# Patient Record
Sex: FEMALE | Race: BLACK OR AFRICAN AMERICAN | NOT HISPANIC OR LATINO | Employment: OTHER | URBAN - METROPOLITAN AREA
[De-identification: names, ages, dates, MRNs, and addresses within clinical notes are randomized per-mention and may not be internally consistent; named-entity substitution may affect disease eponyms.]

---

## 2019-08-09 ENCOUNTER — SOCIAL WORK (OUTPATIENT)
Dept: ADMINISTRATIVE | Facility: OTHER | Age: 24
End: 2019-08-09

## 2019-08-09 NOTE — PROGRESS NOTES
SW met with pt regarding initial OB assessment. Pt stated this is her 3rd pregnancy/0-miscarriage. Pt stated lives with her grandmother/children-3,9 months and able to perform ADL's independently. Pt stated support system is her mother/Rhianna.Pt stated has applied for medicaid. Pt stated does not have WIC. SW provide pt with information on other community resources.  No other needs identified at this time.    Sherine Garcia,MSW  Pager#[phone]

## 2019-12-05 PROBLEM — R10.2 PELVIC PAIN DURING PREGNANCY: Status: ACTIVE | Noted: 2019-12-05

## 2019-12-05 PROBLEM — Z3A.38 38 WEEKS GESTATION OF PREGNANCY: Status: ACTIVE | Noted: 2019-12-05

## 2019-12-05 PROBLEM — O09.93 SUPERVISION OF HIGH RISK PREGNANCY IN THIRD TRIMESTER: Status: ACTIVE | Noted: 2019-12-05

## 2019-12-05 PROBLEM — Z03.71 ENCOUNTER FOR SUSPECTED PREMATURE RUPTURE OF AMNIOTIC MEMBRANES, WITH RUPTURE OF MEMBRANES NOT FOUND: Status: ACTIVE | Noted: 2019-12-05

## 2019-12-05 PROBLEM — O26.899 PELVIC PAIN DURING PREGNANCY: Status: ACTIVE | Noted: 2019-12-05

## 2019-12-05 PROBLEM — Z3A.39 39 WEEKS GESTATION OF PREGNANCY: Status: ACTIVE | Noted: 2019-12-05

## 2019-12-05 PROBLEM — Z98.891 H/O: C-SECTION: Status: ACTIVE | Noted: 2019-12-05

## 2019-12-05 PROBLEM — O09.33 NO PRENATAL CARE IN CURRENT PREGNANCY IN THIRD TRIMESTER: Status: ACTIVE | Noted: 2019-12-05

## 2019-12-13 PROBLEM — O34.219 VBAC (VAGINAL BIRTH AFTER CESAREAN): Status: ACTIVE | Noted: 2019-12-13

## 2019-12-13 PROBLEM — Z03.71 ENCOUNTER FOR SUSPECTED PREMATURE RUPTURE OF AMNIOTIC MEMBRANES, WITH RUPTURE OF MEMBRANES NOT FOUND: Status: RESOLVED | Noted: 2019-12-05 | Resolved: 2019-12-13

## 2019-12-13 PROBLEM — Z37.9 NORMAL LABOR: Status: ACTIVE | Noted: 2019-12-13

## 2019-12-13 PROBLEM — R05.9 COUGH: Status: ACTIVE | Noted: 2019-12-13

## 2019-12-15 PROBLEM — Z37.9 NORMAL LABOR: Status: RESOLVED | Noted: 2019-12-13 | Resolved: 2019-12-15

## 2020-11-16 PROBLEM — Z3A.39 39 WEEKS GESTATION OF PREGNANCY: Status: RESOLVED | Noted: 2019-12-05 | Resolved: 2020-11-16

## 2023-04-21 PROBLEM — F33.2 MDD (MAJOR DEPRESSIVE DISORDER), RECURRENT SEVERE, WITHOUT PSYCHOSIS: Status: ACTIVE | Noted: 2023-04-21

## 2023-04-26 PROBLEM — F10.90 ALCOHOL USE DISORDER: Status: ACTIVE | Noted: 2023-04-26
